# Patient Record
Sex: FEMALE | Race: WHITE
[De-identification: names, ages, dates, MRNs, and addresses within clinical notes are randomized per-mention and may not be internally consistent; named-entity substitution may affect disease eponyms.]

---

## 2019-02-08 ENCOUNTER — HOSPITAL ENCOUNTER (OUTPATIENT)
Dept: HOSPITAL 53 - M SDC | Age: 34
Discharge: HOME | End: 2019-02-08
Attending: SURGERY
Payer: SELF-PAY

## 2019-02-08 VITALS — DIASTOLIC BLOOD PRESSURE: 66 MMHG | SYSTOLIC BLOOD PRESSURE: 105 MMHG

## 2019-02-08 VITALS — BODY MASS INDEX: 35 KG/M2 | WEIGHT: 205 LBS | HEIGHT: 64 IN

## 2019-02-08 DIAGNOSIS — K42.0: ICD-10-CM

## 2019-02-08 DIAGNOSIS — K43.6: Primary | ICD-10-CM

## 2019-02-08 LAB — HCG UR QL: NEGATIVE

## 2019-02-08 PROCEDURE — 49653: CPT

## 2019-02-08 PROCEDURE — 88302 TISSUE EXAM BY PATHOLOGIST: CPT

## 2019-02-08 PROCEDURE — 84703 CHORIONIC GONADOTROPIN ASSAY: CPT

## 2019-02-08 RX ADMIN — IBUPROFEN SCH MG: 600 TABLET, FILM COATED ORAL at 21:42

## 2019-02-08 RX ADMIN — IBUPROFEN SCH MG: 600 TABLET, FILM COATED ORAL at 20:45

## 2019-02-11 NOTE — RO
DATE OF PROCEDURE:  02/08/2019

 

PREOPERATIVE DIAGNOSIS:  Incarcerated ventral hernia.

 

POSTOPERATIVE DIAGNOSIS:  Incarcerated ventral hernia and incarcerated recurrent

umbilical hernia.

 

SURGEON:  Dr. Pieter Patton

 

ASSISTANT:  Nava Cui

 

ANESTHESIA:  General.

 

ESTIMATED BLOOD LOSS:  10.

 

COMPLICATIONS:  None.

 

INDICATIONS FOR PROCEDURE:  The patient is a 33-year-old female with a large lump

inferior to her umbilicus and found to have a large hernia.  Recommendation was

to proceed with laparoscopic repair.  Risks and benefits of the procedure, not

limited to but including bleeding, infection, hernia recurrence, hernia

formation, damage to surrounding structures, and need for further surgery were

discussed in detail with the patient and informed consent was obtained and the

procedure was planned.

 

DESCRIPTION OF PROCEDURE:  The patient was brought back to operating room seven.

After sufficient sedation the abdomen sterilely prepped and draped.  Next a

time-out was done to confirm proper patient and proper procedure.  A 5 mm

incision made in the left upper quadrant and Veress needle was inserted.  The

abdomen was insufflated to 15 mmHg.  Next the Veress needle was removed.  A 5 mm

OptiVu port was used to gain access to the abdomen.  Once the abdomen was

entered, there were two large hernias that were identified.  Another 5 mm port

was then placed in the left lower quadrant.  Small bowel was entering a large

defect inferior to the umbilicus in the midline.  I was able to grab onto the

small bowel and gently return it back inside the abdomen.  I would say at least

25% of the small intestine was within this large hernia sac.  Once that was back

inside, there was still some omentum that was adhesed up inside of there.  I was

able to gently retract that out using the Enseal.  Once that was completed and

all the hernia sac contents were evacuated from there, I was able to take down

some preperitoneal adhesions inferiorly to that using the Enseal to create a

large surface on the fascia to place a mesh.  Next, there was an umbilical defect

as well about the same size containing the falciform ligament and what appeared

to be up balled up portion of mesh from a previous repair.  I was able to remove

the majority of the previous mesh that was balled up as well as some omentum that

was adhered up inside of it.  Once all that was completed, I took down the rest

of the falciform ligament superiorly to create another landing zone for placing

some mesh.  Once the area was all cleaned and up, I measured it on the outside to

determine the size of mesh to use.  The defect in total combining both of the

defect together with the skin bridge between them measured about 10 cm wide x 12

cm in length.  I took a 20 x 25 cm mesh and cut about 2 cm off of both sides and

placed it over top of the abdominal wall and palpated through to make sure it

would be large enough to cover.  After doing so, I placed some transfascial

sutures in the mesh, placed the mesh inside the abdomen and brought the sutures

out through the abdominal wall using a Ant-Carlson needle.  Once that was

done we held those in place.  I used to three Secure Strap tackers to put two

rows of tacks around the perimeter of the mesh and some through the middle of the

mesh.  Once this was completed, the mesh appeared to be well intact to complete

the tack placement.  I did have to place one additional 5 mm port in the right

midabdomen.  Once the mesh was completely attached, we reviewed it from all

angles to make sure that it was completely covered.  The transfascial sutures

were then tied.  The abdomen was desufflated.  Skin incisions were closed #4-0

Vicryl subcuticular sutures.  The abdomen was cleaned and dried.  Steri-Strips,

4x4 and tape were applied, thus ending the procedure.

## 2023-02-03 ENCOUNTER — HOSPITAL ENCOUNTER (OUTPATIENT)
Dept: HOSPITAL 53 - M WHC | Age: 38
End: 2023-02-03
Attending: ADVANCED PRACTICE MIDWIFE
Payer: SELF-PAY

## 2023-02-03 DIAGNOSIS — Z3A.33: ICD-10-CM

## 2023-02-03 DIAGNOSIS — R19.00: ICD-10-CM

## 2023-02-03 DIAGNOSIS — K46.9: ICD-10-CM

## 2023-02-03 DIAGNOSIS — O34.593: Primary | ICD-10-CM

## 2023-02-20 ENCOUNTER — HOSPITAL ENCOUNTER (OUTPATIENT)
Dept: HOSPITAL 53 - M SFHCWAGY | Age: 38
End: 2023-02-20
Attending: OBSTETRICS & GYNECOLOGY
Payer: SELF-PAY

## 2023-02-20 DIAGNOSIS — O09.523: Primary | ICD-10-CM

## 2023-02-20 DIAGNOSIS — Z3A.00: ICD-10-CM
